# Patient Record
Sex: FEMALE | Race: WHITE | NOT HISPANIC OR LATINO | Employment: FULL TIME | URBAN - NONMETROPOLITAN AREA
[De-identification: names, ages, dates, MRNs, and addresses within clinical notes are randomized per-mention and may not be internally consistent; named-entity substitution may affect disease eponyms.]

---

## 2017-05-16 VITALS
WEIGHT: 180 LBS | HEIGHT: 65 IN | BODY MASS INDEX: 29.99 KG/M2 | SYSTOLIC BLOOD PRESSURE: 124 MMHG | DIASTOLIC BLOOD PRESSURE: 70 MMHG

## 2017-05-17 ENCOUNTER — OFFICE VISIT (OUTPATIENT)
Dept: OBSTETRICS AND GYNECOLOGY | Facility: CLINIC | Age: 32
End: 2017-05-17

## 2017-05-17 VITALS
DIASTOLIC BLOOD PRESSURE: 74 MMHG | SYSTOLIC BLOOD PRESSURE: 120 MMHG | WEIGHT: 165 LBS | HEIGHT: 65 IN | BODY MASS INDEX: 27.49 KG/M2

## 2017-05-17 DIAGNOSIS — Z12.4 SCREENING FOR MALIGNANT NEOPLASM OF CERVIX: ICD-10-CM

## 2017-05-17 DIAGNOSIS — Z01.419 ENCOUNTER FOR GYNECOLOGICAL EXAMINATION WITHOUT ABNORMAL FINDING: Primary | ICD-10-CM

## 2017-05-17 PROCEDURE — 99395 PREV VISIT EST AGE 18-39: CPT | Performed by: PHYSICIAN ASSISTANT

## 2017-05-17 RX ORDER — FERROUS SULFATE 325(65) MG
325 TABLET ORAL 2 TIMES DAILY
COMMUNITY
End: 2021-09-22

## 2018-05-30 ENCOUNTER — OFFICE VISIT (OUTPATIENT)
Dept: OBSTETRICS AND GYNECOLOGY | Facility: CLINIC | Age: 33
End: 2018-05-30

## 2018-05-30 VITALS
HEART RATE: 79 BPM | DIASTOLIC BLOOD PRESSURE: 74 MMHG | OXYGEN SATURATION: 99 % | SYSTOLIC BLOOD PRESSURE: 128 MMHG | BODY MASS INDEX: 28.66 KG/M2 | WEIGHT: 172 LBS | RESPIRATION RATE: 16 BRPM | HEIGHT: 65 IN

## 2018-05-30 DIAGNOSIS — Z01.419 ENCOUNTER FOR GYNECOLOGICAL EXAMINATION WITHOUT ABNORMAL FINDING: Primary | ICD-10-CM

## 2018-05-30 DIAGNOSIS — Z12.4 ROUTINE PAPANICOLAOU SMEAR: ICD-10-CM

## 2018-05-30 PROCEDURE — 99395 PREV VISIT EST AGE 18-39: CPT | Performed by: PHYSICIAN ASSISTANT

## 2018-05-30 NOTE — PROGRESS NOTES
"Subjective   Chief Complaint   Patient presents with   • Gynecologic Exam       Hiral Lugo is a 32 y.o. year old  presenting to be seen for her annual gynecological exam.   She has no complaints or concerns  She has had nexplanon for 2 years. Has random light bleed with nexplanon.  Last bleed was May 3 for 5 days    Past Medical History:   Diagnosis Date   • Anemia    • History of Papanicolaou smear of cervix         Current Outpatient Prescriptions:   •  ferrous sulfate 325 (65 FE) MG tablet, Take 325 mg by mouth 2 (Two) Times a Day., Disp: , Rfl:    Allergies   Allergen Reactions   • Minocycline    • Penicillins       Past Surgical History:   Procedure Laterality Date   • MOLE REMOVAL     • NO PAST SURGERIES     • OTHER SURGICAL HISTORY      lip gland removal   • WISDOM TOOTH EXTRACTION        Social History     Social History   • Marital status: Single     Spouse name: N/A   • Number of children: N/A   • Years of education: N/A     Occupational History   • Not on file.     Social History Main Topics   • Smoking status: Never Smoker   • Smokeless tobacco: Not on file   • Alcohol use Yes      Comment: <1 weekly   • Drug use: No   • Sexual activity: Defer     Other Topics Concern   • Not on file     Social History Narrative   • No narrative on file      Family History   Problem Relation Age of Onset   • Hypertension Father        Review of Systems   Constitutional: Negative.    Gastrointestinal: Negative.    Genitourinary: Negative.            Objective   /74   Pulse 79   Resp 16   Ht 165.1 cm (65\")   Wt 78 kg (172 lb)   SpO2 99%   BMI 28.62 kg/m²     Physical Exam   Constitutional: She appears well-developed and well-nourished. She is cooperative.   Pulmonary/Chest: Right breast exhibits no inverted nipple, no mass, no nipple discharge, no skin change and no tenderness. Left breast exhibits no inverted nipple, no mass, no nipple discharge, no skin change and no tenderness.   Abdominal: Soft. " Normal appearance. There is no tenderness.   Genitourinary: Vagina normal and uterus normal. There is no tenderness or lesion on the right labia. There is no tenderness or lesion on the left labia. Cervix exhibits no motion tenderness and no discharge. Right adnexum displays no mass and no tenderness. Left adnexum displays no mass and no tenderness.   Genitourinary Comments: Pap collected   Neurological: She is alert.   Skin: Skin is warm and dry.   Psychiatric: She has a normal mood and affect. Her behavior is normal.            Assessment and Plan  Hiral was seen today for gynecologic exam.    Diagnoses and all orders for this visit:    Encounter for gynecological examination without abnormal finding    Routine Papanicolaou smear  -     Liquid-based Pap Smear, Screening      Patient Instructions   Self breast exam monthly  Regular exercise  Screening mammogram age 36             This note was electronically signed.    Deepa Mercer PA-C   May 30, 2018

## 2018-06-13 DIAGNOSIS — Z12.4 ROUTINE PAPANICOLAOU SMEAR: ICD-10-CM

## 2019-08-14 ENCOUNTER — OFFICE VISIT (OUTPATIENT)
Dept: OBSTETRICS AND GYNECOLOGY | Facility: CLINIC | Age: 34
End: 2019-08-14

## 2019-08-14 VITALS
WEIGHT: 185.6 LBS | BODY MASS INDEX: 30.92 KG/M2 | HEIGHT: 65 IN | DIASTOLIC BLOOD PRESSURE: 82 MMHG | SYSTOLIC BLOOD PRESSURE: 120 MMHG

## 2019-08-14 DIAGNOSIS — Z01.419 ENCOUNTER FOR GYNECOLOGICAL EXAMINATION WITHOUT ABNORMAL FINDING: Primary | ICD-10-CM

## 2019-08-14 DIAGNOSIS — Z12.4 SCREENING FOR CERVICAL CANCER: ICD-10-CM

## 2019-08-14 PROCEDURE — 99395 PREV VISIT EST AGE 18-39: CPT | Performed by: PHYSICIAN ASSISTANT

## 2019-08-14 NOTE — PROGRESS NOTES
"Subjective   Chief Complaint   Patient presents with   • Gynecologic Exam     Last pap done 18-ASCUS with negative HPV   • Contraception     Would like to discuss Nexplanon removal and reinsertion       Hiral Lugo is a 33 y.o. year old  presenting to be seen for her annual gynecological exam.   She has no complaints  She has her second Nexplanon in which was due for removal at the end of July. She desires removal and reinsertion of new Nexplanon  She has been having random bleeds with Nexplanon and last bleed was 19     Past Medical History:   Diagnosis Date   • Anemia    • History of Papanicolaou smear of cervix         Current Outpatient Medications:   •  ferrous sulfate 325 (65 FE) MG tablet, Take 325 mg by mouth 2 (Two) Times a Day., Disp: , Rfl:    Allergies   Allergen Reactions   • Minocycline Other (See Comments)     Caused drug induced Lupus   • Penicillins Other (See Comments)     Had as a baby      Past Surgical History:   Procedure Laterality Date   • MOLE REMOVAL     • NO PAST SURGERIES     • OTHER SURGICAL HISTORY      lip gland removal   • WISDOM TOOTH EXTRACTION        Social History     Socioeconomic History   • Marital status: Single     Spouse name: Not on file   • Number of children: Not on file   • Years of education: Not on file   • Highest education level: Not on file   Tobacco Use   • Smoking status: Never Smoker   • Smokeless tobacco: Never Used   Substance and Sexual Activity   • Alcohol use: Yes     Comment: <1 weekly   • Drug use: No   • Sexual activity: Yes     Birth control/protection: Implant      Family History   Problem Relation Age of Onset   • Hypertension Father        Review of Systems   Constitutional: Negative.    Gastrointestinal: Negative.    Genitourinary: Negative.            Objective   /82   Ht 165.1 cm (65\")   Wt 84.2 kg (185 lb 9.6 oz)   LMP 2019 (Exact Date)   Breastfeeding? No   BMI 30.89 kg/m²     Physical Exam   Constitutional: " She appears well-developed and well-nourished. She is cooperative.   Pulmonary/Chest: Right breast exhibits no inverted nipple, no mass, no nipple discharge, no skin change and no tenderness. Left breast exhibits no inverted nipple, no mass, no nipple discharge, no skin change and no tenderness.   Abdominal: Soft. Normal appearance. There is no tenderness.   Genitourinary: Vagina normal and uterus normal. There is no tenderness or lesion on the right labia. There is no tenderness or lesion on the left labia. Cervix exhibits no motion tenderness and no discharge. Right adnexum displays no mass and no tenderness. Left adnexum displays no mass and no tenderness.   Genitourinary Comments: Pap done   Neurological: She is alert.   Skin: Skin is warm and dry.   Psychiatric: She has a normal mood and affect. Her behavior is normal.            Assessment and Plan  Hiral was seen today for gynecologic exam and contraception.    Diagnoses and all orders for this visit:    Encounter for gynecological examination without abnormal finding    Screening for cervical cancer      Patient Instructions   Encourage self breast exams monthly  regular exercise  RTO 2 weeks for removal and reinsertion nexplanon                This note was electronically signed.    Deepa Mercer PA-C   August 14, 2019

## 2019-08-14 NOTE — PATIENT INSTRUCTIONS
Encourage self breast exams monthly  regular exercise  RTO 2 weeks for removal and reinsertion nexplanon

## 2019-08-22 DIAGNOSIS — Z12.4 SCREENING FOR CERVICAL CANCER: ICD-10-CM

## 2019-09-12 ENCOUNTER — OFFICE VISIT (OUTPATIENT)
Dept: OBSTETRICS AND GYNECOLOGY | Facility: CLINIC | Age: 34
End: 2019-09-12

## 2019-09-12 VITALS
HEIGHT: 65 IN | BODY MASS INDEX: 30.66 KG/M2 | WEIGHT: 184 LBS | DIASTOLIC BLOOD PRESSURE: 80 MMHG | SYSTOLIC BLOOD PRESSURE: 120 MMHG

## 2019-09-12 DIAGNOSIS — Z30.46 NEXPLANON REMOVAL: Primary | ICD-10-CM

## 2019-09-12 DIAGNOSIS — Z30.017 NEXPLANON INSERTION: ICD-10-CM

## 2019-09-12 LAB
B-HCG UR QL: NEGATIVE
INTERNAL NEGATIVE CONTROL: NEGATIVE
INTERNAL POSITIVE CONTROL: POSITIVE
Lab: NORMAL

## 2019-09-12 PROCEDURE — 11983 REMOVE/INSERT DRUG IMPLANT: CPT | Performed by: PHYSICIAN ASSISTANT

## 2019-09-12 PROCEDURE — 81025 URINE PREGNANCY TEST: CPT | Performed by: PHYSICIAN ASSISTANT

## 2020-09-14 ENCOUNTER — OFFICE VISIT (OUTPATIENT)
Dept: OBSTETRICS AND GYNECOLOGY | Facility: CLINIC | Age: 35
End: 2020-09-14

## 2020-09-14 VITALS
WEIGHT: 193 LBS | HEIGHT: 65 IN | BODY MASS INDEX: 32.15 KG/M2 | SYSTOLIC BLOOD PRESSURE: 120 MMHG | DIASTOLIC BLOOD PRESSURE: 78 MMHG

## 2020-09-14 DIAGNOSIS — Z12.4 SCREENING FOR CERVICAL CANCER: ICD-10-CM

## 2020-09-14 DIAGNOSIS — Z01.419 ENCOUNTER FOR GYNECOLOGICAL EXAMINATION WITHOUT ABNORMAL FINDING: Primary | ICD-10-CM

## 2020-09-14 PROCEDURE — 99395 PREV VISIT EST AGE 18-39: CPT | Performed by: PHYSICIAN ASSISTANT

## 2020-09-14 NOTE — PROGRESS NOTES
Subjective   Chief Complaint   Patient presents with   • Gynecologic Exam     Last pap done 19-WNL, No complaints       Hiral Lugo is a 34 y.o. year old  presenting to be seen for her annual gynecological exam.   She has no complaints or concerns  Has Nexplanon for birth control. Has infrequent random bleeding with nexplanon  Has no family history of breast cancer       Past Medical History:   Diagnosis Date   • Anemia    • History of Papanicolaou smear of cervix         Current Outpatient Medications:   •  ferrous sulfate 325 (65 FE) MG tablet, Take 325 mg by mouth 2 (Two) Times a Day., Disp: , Rfl:    Allergies   Allergen Reactions   • Minocycline Other (See Comments)     Caused drug induced Lupus   • Penicillins Other (See Comments)     Had as a baby      Past Surgical History:   Procedure Laterality Date   • MOLE REMOVAL     • NO PAST SURGERIES     • OTHER SURGICAL HISTORY      lip gland removal   • WISDOM TOOTH EXTRACTION        Social History     Socioeconomic History   • Marital status: Single     Spouse name: Not on file   • Number of children: Not on file   • Years of education: Not on file   • Highest education level: Not on file   Occupational History     Employer: [employer]   Social Needs   • Financial resource strain: Not hard at all   • Food insecurity     Worry: Never true     Inability: Never true   • Transportation needs     Medical: No     Non-medical: No   Tobacco Use   • Smoking status: Never Smoker   • Smokeless tobacco: Never Used   Substance and Sexual Activity   • Alcohol use: Yes     Comment: <1 weekly   • Drug use: No   • Sexual activity: Yes     Birth control/protection: Implant   Lifestyle   • Physical activity     Days per week: 0 days     Minutes per session: 0 min   • Stress: Not at all      Family History   Problem Relation Age of Onset   • Hypertension Father        Review of Systems   Constitutional: Negative for chills, diaphoresis and fever.  "  Gastrointestinal: Negative for abdominal pain, nausea and vomiting.   Genitourinary: Negative for difficulty urinating, dysuria, menstrual problem, pelvic pain and vaginal discharge.   Musculoskeletal: Negative.    All other systems reviewed and are negative.          Objective   /78   Ht 165.1 cm (65\")   Wt 87.5 kg (193 lb)   Breastfeeding No   BMI 32.12 kg/m²     Physical Exam  Constitutional:       General: She is not in acute distress.     Appearance: Normal appearance. She is normal weight.   Eyes:      General: Lids are normal.      Extraocular Movements: Extraocular movements intact.      Conjunctiva/sclera: Conjunctivae normal.   Chest:      Breasts:         Right: No inverted nipple, mass, nipple discharge, skin change or tenderness.         Left: No inverted nipple, mass, nipple discharge, skin change or tenderness.   Abdominal:      Palpations: Abdomen is soft.      Tenderness: There is no abdominal tenderness.   Genitourinary:     General: Normal vulva.      Vagina: Normal.      Cervix: Normal.      Uterus: Normal.       Adnexa: Right adnexa normal and left adnexa normal.      Comments: Scant blood seen  Pap done  Musculoskeletal: Normal range of motion.   Neurological:      Mental Status: She is alert.   Psychiatric:         Attention and Perception: Attention normal.         Mood and Affect: Mood normal.         Speech: Speech normal.         Behavior: Behavior is cooperative.         Thought Content: Thought content normal.              Assessment and Plan  Hiral was seen today for gynecologic exam.    Diagnoses and all orders for this visit:    Encounter for gynecological examination without abnormal finding    Screening for cervical cancer  -     Liquid-based Pap Smear, Screening; Future      Patient Instructions   Encourage self breast exam monthly  Regular exercise             This note was electronically signed.    Deepa Mercer PA-C   September 14, 2020  "

## 2020-09-23 DIAGNOSIS — Z12.4 SCREENING FOR CERVICAL CANCER: ICD-10-CM

## 2021-09-22 ENCOUNTER — OFFICE VISIT (OUTPATIENT)
Dept: OBSTETRICS AND GYNECOLOGY | Facility: CLINIC | Age: 36
End: 2021-09-22

## 2021-09-22 VITALS
DIASTOLIC BLOOD PRESSURE: 80 MMHG | WEIGHT: 189.2 LBS | SYSTOLIC BLOOD PRESSURE: 122 MMHG | BODY MASS INDEX: 31.52 KG/M2 | HEIGHT: 65 IN

## 2021-09-22 DIAGNOSIS — Z01.419 ENCOUNTER FOR GYNECOLOGICAL EXAMINATION WITHOUT ABNORMAL FINDING: Primary | ICD-10-CM

## 2021-09-22 DIAGNOSIS — Z12.31 ENCOUNTER FOR SCREENING MAMMOGRAM FOR MALIGNANT NEOPLASM OF BREAST: ICD-10-CM

## 2021-09-22 DIAGNOSIS — Z12.4 SCREENING FOR CERVICAL CANCER: ICD-10-CM

## 2021-09-22 PROCEDURE — 99395 PREV VISIT EST AGE 18-39: CPT | Performed by: PHYSICIAN ASSISTANT

## 2021-09-22 NOTE — PROGRESS NOTES
"Subjective   Chief Complaint   Patient presents with   • Gynecologic Exam     Last pap done 20-WNL, No complaints       Hiral Lugo is a 35 y.o. year old  presenting to be seen for her annual gynecological exam.   She has no complaints or concerns  She has Nexplanon for contraception and this was inserted 2019.  She usually has a monthly pretty regular bleed lasting 4 to 5 days.  She has a maternal aunt with history of breast cancer.  Patient would like to go ahead and get scheduled for screening mammogram when she turns 36 in late December    Past Medical History:   Diagnosis Date   • Anemia    • History of Papanicolaou smear of cervix       No current outpatient medications on file.   Allergies   Allergen Reactions   • Minocycline Other (See Comments)     Caused drug induced Lupus   • Penicillins Other (See Comments)     Had as a baby      Past Surgical History:   Procedure Laterality Date   • MOLE REMOVAL     • NO PAST SURGERIES     • OTHER SURGICAL HISTORY      lip gland removal   • WISDOM TOOTH EXTRACTION        Social History     Socioeconomic History   • Marital status: Single     Spouse name: Not on file   • Number of children: Not on file   • Years of education: Not on file   • Highest education level: Not on file   Tobacco Use   • Smoking status: Never Smoker   • Smokeless tobacco: Never Used   Vaping Use   • Vaping Use: Never used   Substance and Sexual Activity   • Alcohol use: Yes     Comment: <1 weekly   • Drug use: No   • Sexual activity: Yes     Birth control/protection: Implant      Family History   Problem Relation Age of Onset   • Hypertension Father    • Lymphoma Father        Review of Systems   Constitutional: Negative for chills, diaphoresis and fever.   Gastrointestinal: Negative.    Genitourinary: Negative for difficulty urinating, dysuria, menstrual problem, pelvic pain, vaginal bleeding and vaginal discharge.           Objective   /80   Ht 165.1 cm (65\")   " Wt 85.8 kg (189 lb 3.2 oz)   LMP 09/08/2021 (Exact Date)   Breastfeeding No   BMI 31.48 kg/m²     Physical Exam  Constitutional:       Appearance: Normal appearance. She is well-developed and well-groomed.   Eyes:      General: Lids are normal.      Extraocular Movements: Extraocular movements intact.      Conjunctiva/sclera: Conjunctivae normal.   Neck:      Thyroid: No thyroid mass or thyromegaly.   Chest:      Breasts: Breasts are symmetrical.         Right: No inverted nipple, mass, nipple discharge, skin change or tenderness.         Left: No inverted nipple, mass, nipple discharge, skin change or tenderness.   Abdominal:      General: There is no distension.      Palpations: Abdomen is soft. There is no hepatomegaly or splenomegaly.      Tenderness: There is no abdominal tenderness.   Genitourinary:     Exam position: Lithotomy position.      Labia:         Right: No rash, tenderness or lesion.         Left: No rash, tenderness or lesion.       Urethra: No prolapse, urethral pain, urethral swelling or urethral lesion.      Vagina: No vaginal discharge, tenderness or lesions.      Cervix: No cervical motion tenderness, discharge, friability or lesion.      Uterus: Not enlarged and not tender.       Adnexa:         Right: No mass or tenderness.          Left: No mass or tenderness.     Musculoskeletal:      Cervical back: Neck supple.   Lymphadenopathy:      Upper Body:      Right upper body: No axillary adenopathy.      Left upper body: No axillary adenopathy.   Skin:     General: Skin is warm and dry.      Findings: No lesion.   Neurological:      General: No focal deficit present.      Mental Status: She is alert and oriented to person, place, and time.   Psychiatric:         Attention and Perception: Attention normal.         Mood and Affect: Mood normal.         Speech: Speech normal.         Behavior: Behavior normal.         Thought Content: Thought content normal.            Result Review :                    Assessment and Plan  Diagnoses and all orders for this visit:    1. Encounter for gynecological examination without abnormal finding (Primary)    2. Screening for cervical cancer  -     Pap IG, Rfx HPV ASCU; Future    3. Encounter for screening mammogram for malignant neoplasm of breast  -     Mammo Screening Digital Tomosynthesis Bilateral With CAD; Future      Patient Instructions   encourage self breast exam monthly  Screening mammogram when turns 37 yo  Regular exercise             This note was electronically signed.    Deepa Mercer PA-C   September 22, 2021

## 2021-10-06 DIAGNOSIS — Z12.4 SCREENING FOR CERVICAL CANCER: ICD-10-CM

## 2022-01-05 ENCOUNTER — HOSPITAL ENCOUNTER (OUTPATIENT)
Dept: MAMMOGRAPHY | Facility: HOSPITAL | Age: 37
Discharge: HOME | End: 2022-01-05
Admitting: PHYSICIAN ASSISTANT

## 2022-01-05 DIAGNOSIS — Z12.31 ENCOUNTER FOR SCREENING MAMMOGRAM FOR MALIGNANT NEOPLASM OF BREAST: ICD-10-CM

## 2022-01-05 PROCEDURE — 77067 SCR MAMMO BI INCL CAD: CPT

## 2022-01-05 PROCEDURE — 77063 BREAST TOMOSYNTHESIS BI: CPT

## 2022-04-11 ENCOUNTER — OFFICE VISIT (OUTPATIENT)
Dept: INTERNAL MEDICINE | Facility: CLINIC | Age: 37
End: 2022-04-11

## 2022-04-11 VITALS
DIASTOLIC BLOOD PRESSURE: 94 MMHG | WEIGHT: 203 LBS | TEMPERATURE: 97.8 F | HEIGHT: 65 IN | RESPIRATION RATE: 16 BRPM | BODY MASS INDEX: 33.82 KG/M2 | OXYGEN SATURATION: 100 % | HEART RATE: 74 BPM | SYSTOLIC BLOOD PRESSURE: 130 MMHG

## 2022-04-11 DIAGNOSIS — R03.0 ELEVATED BLOOD PRESSURE READING: ICD-10-CM

## 2022-04-11 DIAGNOSIS — D17.22 BENIGN LIPOMATOUS NEOPLASM OF SKIN AND SUBCUTANEOUS TISSUE OF LEFT ARM: ICD-10-CM

## 2022-04-11 DIAGNOSIS — Z13.220 SCREENING FOR CHOLESTEROL LEVEL: ICD-10-CM

## 2022-04-11 DIAGNOSIS — Z76.89 ENCOUNTER TO ESTABLISH CARE: ICD-10-CM

## 2022-04-11 DIAGNOSIS — Z13.228 SCREENING FOR ENDOCRINE, METABOLIC AND IMMUNITY DISORDER: ICD-10-CM

## 2022-04-11 DIAGNOSIS — Z13.0 SCREENING FOR DEFICIENCY ANEMIA: ICD-10-CM

## 2022-04-11 DIAGNOSIS — Z13.29 SCREENING FOR ENDOCRINE, METABOLIC AND IMMUNITY DISORDER: ICD-10-CM

## 2022-04-11 DIAGNOSIS — Z13.0 SCREENING FOR ENDOCRINE, METABOLIC AND IMMUNITY DISORDER: ICD-10-CM

## 2022-04-11 DIAGNOSIS — Z13.29 SCREENING FOR THYROID DISORDER: ICD-10-CM

## 2022-04-11 DIAGNOSIS — R53.83 FATIGUE, UNSPECIFIED TYPE: Primary | ICD-10-CM

## 2022-04-11 DIAGNOSIS — Z13.1 SCREENING FOR DIABETES MELLITUS (DM): ICD-10-CM

## 2022-04-11 LAB
25(OH)D3+25(OH)D2 SERPL-MCNC: 18.4 NG/ML (ref 30–100)
ALBUMIN SERPL-MCNC: 4.4 G/DL (ref 3.5–5.2)
ALBUMIN/GLOB SERPL: 1.5 G/DL
ALP SERPL-CCNC: 86 U/L (ref 39–117)
ALT SERPL-CCNC: 15 U/L (ref 1–33)
AST SERPL-CCNC: 19 U/L (ref 1–32)
BILIRUB SERPL-MCNC: 0.4 MG/DL (ref 0–1.2)
BUN SERPL-MCNC: 12 MG/DL (ref 6–20)
BUN/CREAT SERPL: 12.4 (ref 7–25)
CALCIUM SERPL-MCNC: 9.5 MG/DL (ref 8.6–10.5)
CHLORIDE SERPL-SCNC: 105 MMOL/L (ref 98–107)
CHOLEST SERPL-MCNC: 187 MG/DL (ref 0–200)
CO2 SERPL-SCNC: 21.7 MMOL/L (ref 22–29)
CREAT SERPL-MCNC: 0.97 MG/DL (ref 0.57–1)
EGFRCR SERPLBLD CKD-EPI 2021: 78.3 ML/MIN/1.73
ERYTHROCYTE [DISTWIDTH] IN BLOOD BY AUTOMATED COUNT: 15.1 % (ref 12.3–15.4)
FERRITIN SERPL-MCNC: 11.3 NG/ML (ref 13–150)
FOLATE SERPL-MCNC: 11.7 NG/ML (ref 4.78–24.2)
GLOBULIN SER CALC-MCNC: 2.9 GM/DL
GLUCOSE SERPL-MCNC: 89 MG/DL (ref 65–99)
HBA1C MFR BLD: 4.8 % (ref 4.8–5.6)
HCT VFR BLD AUTO: 39.7 % (ref 34–46.6)
HDLC SERPL-MCNC: 65 MG/DL (ref 40–60)
HGB BLD-MCNC: 12.6 G/DL (ref 12–15.9)
IRON SERPL-MCNC: 45 MCG/DL (ref 37–145)
LDLC SERPL CALC-MCNC: 109 MG/DL (ref 0–100)
MCH RBC QN AUTO: 27.8 PG (ref 26.6–33)
MCHC RBC AUTO-ENTMCNC: 31.7 G/DL (ref 31.5–35.7)
MCV RBC AUTO: 87.4 FL (ref 79–97)
PLATELET # BLD AUTO: 181 10*3/MM3 (ref 140–450)
POTASSIUM SERPL-SCNC: 4.3 MMOL/L (ref 3.5–5.2)
PROT SERPL-MCNC: 7.3 G/DL (ref 6–8.5)
RBC # BLD AUTO: 4.54 10*6/MM3 (ref 3.77–5.28)
RETICS/RBC NFR AUTO: 1.49 % (ref 0.7–1.9)
SODIUM SERPL-SCNC: 140 MMOL/L (ref 136–145)
TRIGL SERPL-MCNC: 69 MG/DL (ref 0–150)
TSH SERPL DL<=0.005 MIU/L-ACNC: 2.5 UIU/ML (ref 0.27–4.2)
VIT B12 SERPL-MCNC: 318 PG/ML (ref 211–946)
VLDLC SERPL CALC-MCNC: 13 MG/DL (ref 5–40)
WBC # BLD AUTO: 5.58 10*3/MM3 (ref 3.4–10.8)

## 2022-04-11 PROCEDURE — 99215 OFFICE O/P EST HI 40 MIN: CPT | Performed by: NURSE PRACTITIONER

## 2022-04-11 RX ORDER — ETONOGESTREL 68 MG/1
IMPLANT SUBCUTANEOUS
COMMUNITY
Start: 2019-09-04 | End: 2022-08-22

## 2022-04-11 RX ORDER — CICLOPIROX 1 G/100ML
SHAMPOO TOPICAL
COMMUNITY
Start: 2022-02-28

## 2022-04-11 NOTE — PROGRESS NOTES
Office Visit      Patient Name: Hiral Lugo  : 1985   MRN: [id number]   Care Team: Patient Care Team:  Teri Galindo APRN as PCP - General (Family Medicine)    Chief Complaint  Establish Care (Recurring cyst? R side, groin area, intermittent x 2 years, swelling)    Subjective     Subjective      Hiral Lugo presents to [facility] to establish care. History of cyst in lower right groin area. Reports it has been there on and off for the past 2-3 years, inconsistently and comes and goes. Currently not present. Location: right lower groin inbetween vagina and leg. Denies wanting to have it looked at today, is going to make an appointment with OBGYN and do her annual pap that is due this year. Worse when sitting down on hard surfaces. Not currently hurting, swollen or causing issues. Onset last occurrence: 3 weeks ago.  Nature: achy, cold, tender.  Pain level: 0/10.  Always goes away on its own without antibiotics or home remedies.    Reports another possible cyst in left forearm. Onset 2 years ago noticed it. Reports it fluctuates in size. No pain, redness, tenderness or issues. States dermatology has looked at site and said it was too deep to remove and to monitor unless she had issues and f/u with pcp     C/o fatigue since after highschool. Hx of anemia before and had to take iron supplements years ago in which she feels like did not help. Denies blood in stool, bleeding gums or easily bruising. No hx of thyroid issues, but diabetes with father and aunt.       Review of Systems   Constitutional: Positive for fatigue and unexpected weight gain. Negative for appetite change.   HENT: Negative for sore throat, swollen glands and trouble swallowing.    Eyes: Negative for visual disturbance.   Respiratory: Negative for shortness of breath.    Cardiovascular: Negative for chest pain.   Gastrointestinal: Negative for blood in stool, constipation, diarrhea and nausea.   Endocrine:  "Positive for polyuria. Negative for cold intolerance, heat intolerance, polydipsia and polyphagia.   Skin: Negative for dry skin and bruise.   Neurological: Negative for dizziness and headache.       Objective     Objective   Vital Signs:   /94   Pulse 74   Temp 97.8 °F (36.6 °C)   Resp 16   Ht 165.1 cm (65\")   Wt 92.1 kg (203 lb)   SpO2 100%   BMI 33.78 kg/m²     Physical Exam  Constitutional:       Appearance: Normal appearance.   HENT:      Head: Normocephalic.   Cardiovascular:      Rate and Rhythm: Normal rate and regular rhythm.   Pulmonary:      Effort: Pulmonary effort is normal.      Breath sounds: Normal breath sounds.   Genitourinary:     Comments: Exam deferred- will f/u with OBGYN per patient  Skin:     General: Skin is warm and dry.          Neurological:      Mental Status: She is alert and oriented to person, place, and time.   Psychiatric:         Mood and Affect: Mood normal.          Assessment / Plan      Assessment/Plan   Problem List Items Addressed This Visit    None     Visit Diagnoses     Fatigue, unspecified type    -  Primary    Relevant Orders    TSH    Hemoglobin A1c    CBC (No Diff)    Iron    Vitamin B12 and Folate    Ferritin    Reticulocytes    Vitamin D 25 hydroxy    Will check labs to r/o issues that may be causing your fatigue. Will contact with results     Encounter to establish care        Screening for thyroid disorder        Relevant Orders    TSH    Screening for diabetes mellitus (DM)        Relevant Orders    Hemoglobin A1c    Screening for deficiency anemia        Relevant Orders    CBC (No Diff)    Iron    Vitamin B12 and Folate    Ferritin    Reticulocytes    Screening for cholesterol level        Relevant Orders    Lipid Panel    Screening for endocrine, metabolic and immunity disorder        Relevant Orders    Comprehensive Metabolic Panel    Elevated blood pressure reading      Monitor BP regularly with machine. Keep feet flat on the floor and sit for 15 " minutes before checking. Notify me via mychart/call if consistently 140s/90s and will require quicker follow up. Exercise/diet changes can help naturally lower BP. Will monitor readings and follow up 6 months unless otherwise indicated    Benign lipomatous neoplasm of skin and subcutaneous tissue of left arm   Monitor site - if grows in size, causes pain, becomes red, tender, warm and painful, follow up   The size will fluctuate with weight changes and this is normal  F/u if worsening symptoms or with changes discussed      F/u with OBGYN as desired to have assessment of groin area- patient states the possible cyst is not currently there or causing issues right now and will make appointment with OBGYN to have that site further assessed and follow up prn        I spent 53 minutes caring for Hiral on this date of service. This time includes time spent by me in the following activities:preparing for the visit, obtaining and/or reviewing a separately obtained history, performing a medically appropriate examination and/or evaluation , counseling and educating the patient/family/caregiver, ordering medications, tests, or procedures and documenting information in the medical record      Follow Up   Return in about 6 months (around 10/11/2022) for Annual.  Patient was given instructions and counseling regarding her condition or for health maintenance advice. Please see specific information pulled into the AVS if appropriate.     Teri Galindo, CHRISTOPH  [facility] [place]

## 2022-04-12 DIAGNOSIS — E55.9 VITAMIN D DEFICIENCY: Primary | ICD-10-CM

## 2022-04-12 RX ORDER — ERGOCALCIFEROL 1.25 MG/1
50000 CAPSULE ORAL WEEKLY
Qty: 24 CAPSULE | Refills: 0 | Status: SHIPPED | OUTPATIENT
Start: 2022-04-12 | End: 2022-10-21

## 2022-07-14 ENCOUNTER — TELEPHONE (OUTPATIENT)
Dept: INTERNAL MEDICINE | Facility: CLINIC | Age: 37
End: 2022-07-14

## 2022-07-14 NOTE — TELEPHONE ENCOUNTER
Caller: Hiral Lugo    Relationship to patient: Self    Best call back number: [phone]     Date of exposure: 07.08.22  Date of positive COVID19 test: 07.14.22    Date if possible COVID19 exposure: 07.08.22    COVID19 symptoms: SORE THROAT, CHEST ANDNASAL CONGESTION, PRODUCTIVE COUGH,    Date of initial quarantine: 07.14.22    Additional information or concerns: WHAT SHOULD SHE DO?  What is the patients preferred pharmacy:     [facility]  TELEPHONE   [phone]

## 2022-07-14 NOTE — TELEPHONE ENCOUNTER
Spoke with patient and advised her to drink plenty of water, plenty of rest OTC medications, monitor oxygen and if she becomes severely SOB to go to ED

## 2022-07-18 NOTE — TELEPHONE ENCOUNTER
She needs to mask around the . After quarantine it is recommended to wear a mask for an additional 5 days. If she still has symptoms/fever I would continue to quarantine until symptoms improvement and fever free without medications.

## 2022-07-18 NOTE — TELEPHONE ENCOUNTER
Spoke with patient and she states that she is out of quarantine tomorrow, states she will have a new nephew this week,  wanting to know should she wait to see them?

## 2022-08-22 ENCOUNTER — OFFICE VISIT (OUTPATIENT)
Dept: OBSTETRICS AND GYNECOLOGY | Facility: CLINIC | Age: 37
End: 2022-08-22

## 2022-08-22 VITALS
SYSTOLIC BLOOD PRESSURE: 120 MMHG | HEIGHT: 65 IN | BODY MASS INDEX: 34.95 KG/M2 | DIASTOLIC BLOOD PRESSURE: 76 MMHG | WEIGHT: 209.8 LBS

## 2022-08-22 DIAGNOSIS — Z30.46 NEXPLANON REMOVAL: Primary | ICD-10-CM

## 2022-08-22 DIAGNOSIS — Z30.011 ENCOUNTER FOR INITIAL PRESCRIPTION OF CONTRACEPTIVE PILLS: ICD-10-CM

## 2022-08-22 PROCEDURE — 11982 REMOVE DRUG IMPLANT DEVICE: CPT | Performed by: PHYSICIAN ASSISTANT

## 2022-08-22 PROCEDURE — 99212 OFFICE O/P EST SF 10 MIN: CPT | Performed by: PHYSICIAN ASSISTANT

## 2022-08-22 RX ORDER — LEVONORGESTREL AND ETHINYL ESTRADIOL 0.1-0.02MG
1 KIT ORAL DAILY
Qty: 28 TABLET | Refills: 3 | Status: SHIPPED | OUTPATIENT
Start: 2022-08-22 | End: 2022-10-25

## 2022-08-22 NOTE — PROGRESS NOTES
Nexplanon Removal    Date of procedure:  8/22/2022    Risks and benefits discussed? yes  All questions answered? yes  Consents given by the patient  Written consent obtained? yes  Reason for removal: Device expiration    Local anesthesia used:  yes - 0.5 cc's of local anesthesia: 1% lidocaine with epinephrine    Procedure documentation:    The upper left arm was marked at the intended site of removal.  Betadine was used to cleanse the skin.  Local anesthesia was injected.  A vertical incision was created at the distal tip of the implant.  The implant was removed intact without difficulty.  Steri-strips were then placed across the site of insertion and the arm was wrapped.    She tolerated the procedure well.  There were no complications.  EBL was minimal.    Post procedure instructions: Remove the wrapping in 24 hours and the steri-strips in 5 days.    Follow up needed: PRN    This note was electronically signed.    Deepa Mercer PA-C.  August 22, 2022

## 2022-08-22 NOTE — PROGRESS NOTES
Subjective   Chief Complaint   Patient presents with   • Procedure     Patient is here today for a Nexplanon removal.  Would like a prescription of oral contraceptives       Hiral Lugo is a 36 y.o. year old  presenting to be seen for removal of Nexplanon which expires in September.   She desires a prescription for ocp but is unsure if she will start them immediately  Has annual exam scheduled in October    Past Medical History:   Diagnosis Date   • Allergic     Seasonal allergies, penicillin, minocyclin   • Anemia    • History of Papanicolaou smear of cervix         Current Outpatient Medications:   •  ciclopirox (LOPROX) 1 % shampoo, , Disp: , Rfl:   •  vitamin D (ERGOCALCIFEROL) 1.25 MG (51615 UT) capsule capsule, Take 1 capsule by mouth 1 (One) Time Per Week., Disp: 24 capsule, Rfl: 0  •  levonorgestrel-ethinyl estradiol (AVIANE,ALESSE,LESSINA) 0.1-20 MG-MCG per tablet, Take 1 tablet by mouth Daily., Disp: 28 tablet, Rfl: 3   Allergies   Allergen Reactions   • Minocycline Other (See Comments)     Caused drug induced Lupus   • Penicillins Other (See Comments)     Had as a baby      Past Surgical History:   Procedure Laterality Date   • EYE SURGERY  2016    Lasik   • MOLE REMOVAL     • NO PAST SURGERIES     • OTHER SURGICAL HISTORY      lip gland removal   • WISDOM TOOTH EXTRACTION        Social History     Socioeconomic History   • Marital status: Single   Tobacco Use   • Smoking status: Never Smoker   • Smokeless tobacco: Never Used   Vaping Use   • Vaping Use: Never used   Substance and Sexual Activity   • Alcohol use: Yes     Alcohol/week: 1.0 standard drink     Types: 1 Glasses of wine per week     Comment: Drink occasionally: less than 1 per week   • Drug use: No   • Sexual activity: Yes     Partners: Male     Birth control/protection: Partner's vasectomy      Family History   Problem Relation Age of Onset   • Hypertension Father    • Lymphoma Father    • Cancer Father         Lymphoma;  "scans showed no cancer Dec 2021   • Diabetes Father    • Heart disease Maternal Grandfather          from heart attack    • Cancer Paternal Grandmother         Breast cancer; diagnosed 2021   • Breast cancer Paternal Grandmother    • Hypertension Mother        Review of Systems   Constitutional: Negative for chills, diaphoresis and fever.   Gastrointestinal: Negative.    Genitourinary: Negative for difficulty urinating, dysuria, menstrual problem and pelvic pain.           Objective   /76   Ht 165.1 cm (65\")   Wt 95.2 kg (209 lb 12.8 oz)   Breastfeeding No   BMI 34.91 kg/m²     Physical Exam  Constitutional:       Appearance: Normal appearance. She is well-developed and well-groomed.   Eyes:      General: Lids are normal.      Extraocular Movements: Extraocular movements intact.      Conjunctiva/sclera: Conjunctivae normal.   Skin:     General: Skin is warm and dry.      Findings: No bruising or lesion.   Neurological:      Mental Status: She is alert.   Psychiatric:         Attention and Perception: Attention normal.         Mood and Affect: Mood normal.         Speech: Speech normal.         Behavior: Behavior is cooperative.            Result Review :                   Assessment and Plan  Diagnoses and all orders for this visit:    1. Nexplanon removal (Primary)    2. Encounter for initial prescription of contraceptive pills    Other orders  -     levonorgestrel-ethinyl estradiol (AVIANE,ALESSE,LESSINA) 0.1-20 MG-MCG per tablet; Take 1 tablet by mouth Daily.  Dispense: 28 tablet; Refill: 3      Patient Instructions   Follow up October for annual exam/pap  If decides to start ocp start the  after menses start  Take ocp consistently. Ocp not effective until completes first pack              This note was electronically signed.    Deepa Mercer PA-C   2022  "

## 2022-08-22 NOTE — PATIENT INSTRUCTIONS
Follow up October for annual exam/pap  If decides to start ocp start the Sunday after menses start  Take ocp consistently. Ocp not effective until completes first pack

## 2022-10-21 ENCOUNTER — OFFICE VISIT (OUTPATIENT)
Dept: INTERNAL MEDICINE | Facility: CLINIC | Age: 37
End: 2022-10-21

## 2022-10-21 VITALS
OXYGEN SATURATION: 98 % | BODY MASS INDEX: 34.99 KG/M2 | TEMPERATURE: 97.5 F | SYSTOLIC BLOOD PRESSURE: 110 MMHG | DIASTOLIC BLOOD PRESSURE: 80 MMHG | HEART RATE: 89 BPM | WEIGHT: 210 LBS | HEIGHT: 65 IN

## 2022-10-21 DIAGNOSIS — R79.0 LOW FERRITIN: ICD-10-CM

## 2022-10-21 DIAGNOSIS — R53.83 OTHER FATIGUE: ICD-10-CM

## 2022-10-21 DIAGNOSIS — Z00.00 ANNUAL PHYSICAL EXAM: ICD-10-CM

## 2022-10-21 DIAGNOSIS — E55.9 VITAMIN D DEFICIENCY: ICD-10-CM

## 2022-10-21 DIAGNOSIS — Z00.00 ANNUAL PHYSICAL EXAM: Primary | ICD-10-CM

## 2022-10-21 DIAGNOSIS — Z11.59 ENCOUNTER FOR HEPATITIS C SCREENING TEST FOR LOW RISK PATIENT: ICD-10-CM

## 2022-10-21 DIAGNOSIS — E66.9 OBESITY (BMI 30-39.9): ICD-10-CM

## 2022-10-21 PROCEDURE — 99395 PREV VISIT EST AGE 18-39: CPT | Performed by: NURSE PRACTITIONER

## 2022-10-21 NOTE — PROGRESS NOTES
"Subjective   Hiral Lugo is a 36 y.o. female and is here for a comprehensive physical exam. Reports that she is still tired.  She completed her prescription of vitamin D 2 weeks ago.  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  Has appointment to get her Pap smear.  Last 1 2021 which was normal.  Had mammogram in 2022.    HPI:    Health Habits:  Eye exam within last 2 years? No, will schedule   Dental exam every 6 months? Yes   Exercise habits: No, trying to get back into exercise   Healthy diet? Typical american diet   Do you take any herbs or supplements that were not prescribed by a doctor? no  Are you taking aspirin daily? No      History:  LMP: Patient's last menstrual period was 10/15/2022 (approximate).  Menopause: No  Last pap date:   Abnormal pap? no  Family history of breast or ovarian cancer: paternal grandmother, breast cancer (age 92)   Mammogram: 2022, normal       OB History    Para Term  AB Living   0 0 0 0 0 0   SAB IAB Ectopic Molar Multiple Live Births   0 0 0   0        reports being sexually active and has had partner(s) who are male. She reports using the following method of birth control/protection: stopped oral birth control.     The following portions of the patient's history were reviewed and updated as appropriate: She  has a past medical history of Allergic (), Anemia, and History of Papanicolaou smear of cervix ().  She does not have a problem list on file.  She  has a past surgical history that includes No past surgeries; Jackson tooth extraction; Other surgical history; Mole removal; and Eye surgery (2016).  Her family history includes Breast cancer in her paternal grandmother; Cancer in her father and paternal grandmother; Diabetes in her father; Heart disease in her maternal grandfather; Hypertension in her father and mother; Lymphoma in her father.  She  reports that she has never smoked. She has never used " "smokeless tobacco. She reports current alcohol use of about 2.0 standard drinks per week. She reports that she does not use drugs.  Current Outpatient Medications   Medication Sig Dispense Refill   • ciclopirox (LOPROX) 1 % shampoo      • levonorgestrel-ethinyl estradiol (AVIANE,ALESSE,LESSINA) 0.1-20 MG-MCG per tablet Take 1 tablet by mouth Daily. 28 tablet 3     No current facility-administered medications for this visit.       Review of Systems    Review of Systems   Constitutional: Positive for fatigue.   All other systems reviewed and are negative.        Objective   /80   Pulse 89   Temp 97.5 °F (36.4 °C) (Temporal)   Ht 165.1 cm (65\")   Wt 95.3 kg (210 lb)   LMP 10/15/2022 (Approximate)   SpO2 98%   BMI 34.95 kg/m²     Physical Exam  Vitals and nursing note reviewed.   Constitutional:       Appearance: Normal appearance. She is obese.   HENT:      Head: Normocephalic and atraumatic.      Right Ear: Tympanic membrane normal.      Left Ear: Tympanic membrane normal.      Nose: Nose normal.      Mouth/Throat:      Mouth: Mucous membranes are moist.      Pharynx: Oropharynx is clear.   Eyes:      General:         Right eye: No discharge.         Left eye: No discharge.      Extraocular Movements: Extraocular movements intact.      Conjunctiva/sclera: Conjunctivae normal.      Pupils: Pupils are equal, round, and reactive to light.   Neck:      Thyroid: No thyromegaly.      Vascular: No carotid bruit.   Cardiovascular:      Rate and Rhythm: Normal rate and regular rhythm.      Pulses: Normal pulses.      Heart sounds: Normal heart sounds.   Pulmonary:      Effort: Pulmonary effort is normal.      Breath sounds: Normal breath sounds.   Abdominal:      General: Abdomen is flat. Bowel sounds are normal.      Palpations: Abdomen is soft.      Tenderness: There is no abdominal tenderness. There is no guarding.   Genitourinary:     Comments: Defer OBGYN  Musculoskeletal:         General: Normal range of " motion.      Cervical back: Normal range of motion and neck supple.   Lymphadenopathy:      Cervical: No cervical adenopathy.   Skin:     General: Skin is warm and dry.      Coloration: Skin is not jaundiced or pale.      Findings: No rash.   Neurological:      Mental Status: She is alert and oriented to person, place, and time.      Cranial Nerves: No cranial nerve deficit.      Sensory: No sensory deficit.      Motor: No weakness.      Coordination: Coordination normal.      Gait: Gait normal.   Psychiatric:         Mood and Affect: Mood normal.         Behavior: Behavior normal.       No visits with results within 6 Month(s) from this visit.   Latest known visit with results is:   Office Visit on 04/11/2022   Component Date Value Ref Range Status   • TSH 04/11/2022 2.500  0.270 - 4.200 uIU/mL Final   • Hemoglobin A1C 04/11/2022 4.80  4.80 - 5.60 % Final    Comment: Hemoglobin A1C Ranges:  Increased Risk for Diabetes  5.7% to 6.4%  Diabetes                     >= 6.5%  Diabetic Goal                < 7.0%     • Glucose 04/11/2022 89  65 - 99 mg/dL Final   • BUN 04/11/2022 12  6 - 20 mg/dL Final   • Creatinine 04/11/2022 0.97  0.57 - 1.00 mg/dL Final   • EGFR Result 04/11/2022 78.3  >60.0 mL/min/1.73 Final    Comment: National Kidney Foundation and American Society of  Nephrology (ASN) Task Force recommended calculation based  on the Chronic Kidney Disease Epidemiology Collaboration  (CKD-EPI) equation refit without adjustment for race.  GFR Normal >60  Chronic Kidney Disease <60  Kidney Failure <15     • BUN/Creatinine Ratio 04/11/2022 12.4  7.0 - 25.0 Final   • Sodium 04/11/2022 140  136 - 145 mmol/L Final   • Potassium 04/11/2022 4.3  3.5 - 5.2 mmol/L Final   • Chloride 04/11/2022 105  98 - 107 mmol/L Final   • Total CO2 04/11/2022 21.7 (L)  22.0 - 29.0 mmol/L Final   • Calcium 04/11/2022 9.5  8.6 - 10.5 mg/dL Final   • Total Protein 04/11/2022 7.3  6.0 - 8.5 g/dL Final   • Albumin 04/11/2022 4.40  3.50 - 5.20  g/dL Final   • Globulin 04/11/2022 2.9  gm/dL Final   • A/G Ratio 04/11/2022 1.5  g/dL Final   • Total Bilirubin 04/11/2022 0.4  0.0 - 1.2 mg/dL Final   • Alkaline Phosphatase 04/11/2022 86  39 - 117 U/L Final   • AST (SGOT) 04/11/2022 19  1 - 32 U/L Final   • ALT (SGPT) 04/11/2022 15  1 - 33 U/L Final   • Total Cholesterol 04/11/2022 187  0 - 200 mg/dL Final    Comment: Cholesterol Reference Ranges  (U.S. Department of Health and Human Services ATP III  Classifications)  Desirable          <200 mg/dL  Borderline High    200-239 mg/dL  High Risk          >240 mg/dL  Triglyceride Reference Ranges  (U.S. Department of Health and Human Services ATP III  Classifications)  Normal           <150 mg/dL  Borderline High  150-199 mg/dL  High             200-499 mg/dL  Very High        >500 mg/dL  HDL Reference Ranges  (U.S. Department of Health and Human Services ATP III  Classifications)  Low     <40 mg/dl (major risk factor for CHD)  High    >60 mg/dl ('negative' risk factor for CHD)  LDL Reference Ranges  (U.S. Department of Health and Human Services ATP III  Classifications)  Optimal          <100 mg/dL  Near Optimal     100-129 mg/dL  Borderline High  130-159 mg/dL  High             160-189 mg/dL  Very High        >189 mg/dL     • Triglycerides 04/11/2022 69  0 - 150 mg/dL Final   • HDL Cholesterol 04/11/2022 65 (H)  40 - 60 mg/dL Final   • VLDL Cholesterol Satya 04/11/2022 13  5 - 40 mg/dL Final   • LDL Chol Calc ([facility]) 04/11/2022 109 (H)  0 - 100 mg/dL Final   • WBC 04/11/2022 5.58  3.40 - 10.80 10*3/mm3 Final   • RBC 04/11/2022 4.54  3.77 - 5.28 10*6/mm3 Final   • Hemoglobin 04/11/2022 12.6  12.0 - 15.9 g/dL Final   • Hematocrit 04/11/2022 39.7  34.0 - 46.6 % Final   • MCV 04/11/2022 87.4  79.0 - 97.0 fL Final   • MCH 04/11/2022 27.8  26.6 - 33.0 pg Final   • MCHC 04/11/2022 31.7  31.5 - 35.7 g/dL Final   • RDW 04/11/2022 15.1  12.3 - 15.4 % Final   • Platelets 04/11/2022 181  140 - 450 10*3/mm3 Final   • Iron  04/11/2022 45  37 - 145 mcg/dL Final   • Vitamin B-12 04/11/2022 318  211 - 946 pg/mL Final    Results may be falsely increased if patient taking Biotin.   • Folate 04/11/2022 11.70  4.78 - 24.20 ng/mL Final    Results may be falsely increased if patient taking Biotin.   • Ferritin 04/11/2022 11.30 (L)  13.00 - 150.00 ng/mL Final    Results may be falsely decreased if patient taking Biotin.   • Reticulocyte Absolute 04/11/2022 1.49  0.70 - 1.90 % Final   • 25 Hydroxy, Vitamin D 04/11/2022 18.4 (L)  30.0 - 100.0 ng/ml Final    Comment: Results may be falsely increased if patient taking Biotin.  Reference Range for Total Vitamin D 25(OH)  Deficiency <20.0 ng/mL  Insufficiency 21-29 ng/mL  Sufficiency  ng/mL  Toxicity >100 ng/ml           Assessment & Plan   Healthy female exam.     1.    Diagnosis Plan   1. Annual physical exam  Ferritin    CBC w AUTO Differential    STAR With / DsDNA, RNP, Sjogrens A / B, Trent    Vitamin D 25 hydroxy  Labs reviewed from last visit.  Age-appropriate immunizations and screenings discussed.      2. Other fatigue  STAR With / DsDNA, RNP, Sjogrens A / B, Trent      3. Vitamin D deficiency  Vitamin D 25 hydroxy      4. Low ferritin  Ferritin      5. Obesity (BMI 30-39.9)  Diet and exercise regimen discussed.      6. Encounter for hepatitis C screening test for low risk patient  Hepatitis C antibody            2. Patient Counseling:  -Health maintenance information provided and discussed  -Counseled on age-appropriate health screenings  -Immunizations for age discussed, encouraged.  -Encouraged 30 minutes of exercise 5 days a week, or 150 minutes total per week.  -Recommend healthy diet choices and portion control   -Discussed importance of regular dental visits and cleanings every 6 months.  -Discussed importance of regular eye exams    3. Discussed the patient's BMI with her.  The BMI is above average; BMI management plan is completed  4. Return in about 1 year (around 10/21/2023)  for Annual.         Teri Matthew, APRN  10/21/2022  08:49 EDT

## 2022-10-23 DIAGNOSIS — D64.9 ANEMIA, UNSPECIFIED TYPE: Primary | ICD-10-CM

## 2022-10-24 LAB
25(OH)D3+25(OH)D2 SERPL-MCNC: 40.7 NG/ML (ref 30–100)
ANA SER QL: NEGATIVE
BASOPHILS # BLD AUTO: 0.03 10*3/MM3 (ref 0–0.2)
BASOPHILS NFR BLD AUTO: 0.8 % (ref 0–1.5)
EOSINOPHIL # BLD AUTO: 0.14 10*3/MM3 (ref 0–0.4)
EOSINOPHIL NFR BLD AUTO: 3.9 % (ref 0.3–6.2)
ERYTHROCYTE [DISTWIDTH] IN BLOOD BY AUTOMATED COUNT: 14.6 % (ref 12.3–15.4)
FERRITIN SERPL-MCNC: 16.7 NG/ML (ref 13–150)
HCT VFR BLD AUTO: 36.7 % (ref 34–46.6)
HCV AB S/CO SERPL IA: <0.1 S/CO RATIO (ref 0–0.9)
HGB BLD-MCNC: 11.7 G/DL (ref 12–15.9)
IMM GRANULOCYTES # BLD AUTO: 0 10*3/MM3 (ref 0–0.05)
IMM GRANULOCYTES NFR BLD AUTO: 0 % (ref 0–0.5)
LYMPHOCYTES # BLD AUTO: 0.98 10*3/MM3 (ref 0.7–3.1)
LYMPHOCYTES NFR BLD AUTO: 27.1 % (ref 19.6–45.3)
MCH RBC QN AUTO: 26.1 PG (ref 26.6–33)
MCHC RBC AUTO-ENTMCNC: 31.9 G/DL (ref 31.5–35.7)
MCV RBC AUTO: 81.7 FL (ref 79–97)
MONOCYTES # BLD AUTO: 0.26 10*3/MM3 (ref 0.1–0.9)
MONOCYTES NFR BLD AUTO: 7.2 % (ref 5–12)
NEUTROPHILS # BLD AUTO: 2.2 10*3/MM3 (ref 1.7–7)
NEUTROPHILS NFR BLD AUTO: 61 % (ref 42.7–76)
NRBC BLD AUTO-RTO: 0 /100 WBC (ref 0–0.2)
PLATELET # BLD AUTO: 220 10*3/MM3 (ref 140–450)
RBC # BLD AUTO: 4.49 10*6/MM3 (ref 3.77–5.28)
WBC # BLD AUTO: 3.61 10*3/MM3 (ref 3.4–10.8)

## 2022-10-24 NOTE — PROGRESS NOTES
Vit D normal range but still low end of normal. Recommend Vit D - at least 600 IU daily and 15 mins of sunlight to face/arms daily.   Hemoglobin slightly low - I added some additional labs to check for iron, B12, Folate deficiencies, these should be back in a few days   Ferritin (iron stores) low range of normal.   Hep C antibody   Still waiting on STAR

## 2022-10-25 ENCOUNTER — OFFICE VISIT (OUTPATIENT)
Dept: OBSTETRICS AND GYNECOLOGY | Facility: CLINIC | Age: 37
End: 2022-10-25

## 2022-10-25 VITALS
WEIGHT: 211.2 LBS | BODY MASS INDEX: 35.19 KG/M2 | DIASTOLIC BLOOD PRESSURE: 80 MMHG | HEIGHT: 65 IN | SYSTOLIC BLOOD PRESSURE: 130 MMHG

## 2022-10-25 DIAGNOSIS — Z12.4 SCREENING FOR CERVICAL CANCER: ICD-10-CM

## 2022-10-25 DIAGNOSIS — N75.0 CYST OF RIGHT BARTHOLIN'S GLAND: ICD-10-CM

## 2022-10-25 DIAGNOSIS — Z12.31 ENCOUNTER FOR SCREENING MAMMOGRAM FOR MALIGNANT NEOPLASM OF BREAST: ICD-10-CM

## 2022-10-25 DIAGNOSIS — Z01.419 WELL WOMAN EXAM WITH ROUTINE GYNECOLOGICAL EXAM: Primary | ICD-10-CM

## 2022-10-25 PROCEDURE — 99395 PREV VISIT EST AGE 18-39: CPT | Performed by: PHYSICIAN ASSISTANT

## 2022-10-25 NOTE — PROGRESS NOTES
Subjective   Chief Complaint   Patient presents with   • Gynecologic Exam     Annual, LMP: 10/19/2022, LAST PAP: 2022 WNL, MMG: DUE SHELBI, PT states she feel a knot in the inner genital area thinks it could be a gland swollen.       Hiral Lugo is a 36 y.o. year old  presenting to be seen for her annual gynecological exam.   LMP 10/19/2022. Partner has vasectomy  Has noted small lump/swelling at right vaginal opening for about 2 years. On occasion is uncomfortable sitting but not often.   Usually has no pain with lump. Has never drained  Screening mammogram due in January and prefers to do annual mammograms     Past Medical History:   Diagnosis Date   • Allergic     Seasonal allergies, penicillin, minocyclin   • Anemia    • History of Papanicolaou smear of cervix         Current Outpatient Medications:   •  ciclopirox (LOPROX) 1 % shampoo, , Disp: , Rfl:    Allergies   Allergen Reactions   • Minocycline Other (See Comments)     Caused drug induced Lupus   • Penicillins Other (See Comments)     Had as a baby      Past Surgical History:   Procedure Laterality Date   • EYE SURGERY  2016    Lasik   • MOLE REMOVAL     • NO PAST SURGERIES     • OTHER SURGICAL HISTORY      lip gland removal   • WISDOM TOOTH EXTRACTION        Social History     Socioeconomic History   • Marital status: Single   Tobacco Use   • Smoking status: Never   • Smokeless tobacco: Never   Vaping Use   • Vaping Use: Never used   Substance and Sexual Activity   • Alcohol use: Yes     Alcohol/week: 2.0 standard drinks     Types: 2 Glasses of wine per week     Comment: Drink occasionally: less than 1 per week   • Drug use: No   • Sexual activity: Yes     Partners: Male     Birth control/protection: Vasectomy      Family History   Problem Relation Age of Onset   • Hypertension Father    • Lymphoma Father    • Cancer Father         Lymphoma; scans showed no cancer Dec 2021   • Diabetes Father    • Heart disease Maternal Grandfather   "        from heart attack    • Cancer Paternal Grandmother         Breast cancer; diagnosed 2021   • Breast cancer Paternal Grandmother    • Hypertension Mother        Review of Systems   Constitutional: Negative for chills, diaphoresis and fever.   Gastrointestinal: Negative.    Genitourinary: Negative for difficulty urinating, dysuria, menstrual problem, pelvic pain and vaginal discharge.           Objective   /80   Ht 165.1 cm (65\")   Wt 95.8 kg (211 lb 3.2 oz)   LMP 10/15/2022 (Approximate)   BMI 35.15 kg/m²     Physical Exam  Constitutional:       Appearance: Normal appearance. She is well-developed and well-groomed.   Eyes:      General: Lids are normal.      Extraocular Movements: Extraocular movements intact.      Conjunctiva/sclera: Conjunctivae normal.   Chest:   Breasts:     Breasts are symmetrical.      Right: No inverted nipple, mass, nipple discharge, skin change or tenderness.      Left: No inverted nipple, mass, nipple discharge, skin change or tenderness.   Abdominal:      General: There is no distension.      Palpations: Abdomen is soft.      Tenderness: There is no abdominal tenderness.   Genitourinary:     Labia:         Right: Lesion present. No rash or tenderness.         Left: No rash, tenderness or lesion.       Urethra: No prolapse, urethral pain, urethral swelling or urethral lesion.      Vagina: No vaginal discharge, tenderness or lesions.      Cervix: No cervical motion tenderness, discharge, friability or lesion.      Uterus: Not enlarged and not tender.       Adnexa:         Right: No mass or tenderness.          Left: No mass or tenderness.        Comments: 2.5 cm right bartholin cyst. nontender  No signs of inflammation   Lymphadenopathy:      Upper Body:      Right upper body: No axillary adenopathy.      Left upper body: No axillary adenopathy.   Skin:     General: Skin is warm and dry.      Findings: No bruising or lesion.   Neurological:      General: No " focal deficit present.      Mental Status: She is alert and oriented to person, place, and time.   Psychiatric:         Attention and Perception: Attention normal.         Mood and Affect: Mood normal.         Speech: Speech normal.         Behavior: Behavior is cooperative.            Result Review :                   Assessment and Plan  Diagnoses and all orders for this visit:    1. Well woman exam with routine gynecological exam (Primary)    2. Screening for cervical cancer  -     LIQUID-BASED PAP SMEAR, [facility] ([facility])    3. Cyst of right Bartholin's gland    4. Encounter for screening mammogram for malignant neoplasm of breast  -     Mammo Screening Digital Tomosynthesis Bilateral With CAD; Future      Patient Instructions   Self breast exam monthly  Regular exercise    With asymptomatic Bartholin cyst observation for now. Call prn if notes any changes             This note was electronically signed.    Deepa Mercer PA-C   October 25, 2022

## 2022-10-25 NOTE — PATIENT INSTRUCTIONS
Self breast exam monthly  Regular exercise    With asymptomatic Bartholin cyst observation for now. Call prn if notes any changes

## 2022-10-27 LAB — REF LAB TEST METHOD: NORMAL

## 2023-03-31 ENCOUNTER — HOSPITAL ENCOUNTER (OUTPATIENT)
Dept: MAMMOGRAPHY | Facility: HOSPITAL | Age: 38
Discharge: HOME | End: 2023-03-31
Admitting: PHYSICIAN ASSISTANT
Payer: COMMERCIAL

## 2023-03-31 DIAGNOSIS — Z12.31 ENCOUNTER FOR SCREENING MAMMOGRAM FOR MALIGNANT NEOPLASM OF BREAST: ICD-10-CM

## 2023-03-31 PROCEDURE — 77063 BREAST TOMOSYNTHESIS BI: CPT

## 2023-03-31 PROCEDURE — 77067 SCR MAMMO BI INCL CAD: CPT

## 2023-04-04 DIAGNOSIS — R92.8 ABNORMAL SCREENING MAMMOGRAM: Primary | ICD-10-CM

## 2023-04-04 NOTE — PROGRESS NOTES
Please inform patient her screening mammogram has returned showing an asymmetry in the left breast.  The radiologist would like to get a diagnostic left mammogram and also a left breast ultrasound.  I have placed orders for these and she should be contacted by scheduling.

## 2023-05-30 ENCOUNTER — HOSPITAL ENCOUNTER (OUTPATIENT)
Dept: ULTRASOUND IMAGING | Facility: HOSPITAL | Age: 38
Discharge: HOME | End: 2023-05-30

## 2023-05-30 ENCOUNTER — HOSPITAL ENCOUNTER (OUTPATIENT)
Dept: MAMMOGRAPHY | Facility: HOSPITAL | Age: 38
Discharge: HOME | End: 2023-05-30

## 2023-05-30 DIAGNOSIS — R92.8 ABNORMAL SCREENING MAMMOGRAM: ICD-10-CM

## 2023-05-30 PROCEDURE — G0279 TOMOSYNTHESIS, MAMMO: HCPCS

## 2023-05-30 PROCEDURE — 77065 DX MAMMO INCL CAD UNI: CPT
